# Patient Record
Sex: FEMALE | Race: WHITE | Employment: STUDENT | URBAN - METROPOLITAN AREA
[De-identification: names, ages, dates, MRNs, and addresses within clinical notes are randomized per-mention and may not be internally consistent; named-entity substitution may affect disease eponyms.]

---
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
## 2018-05-31 ENCOUNTER — OFFICE VISIT (OUTPATIENT)
Dept: FAMILY MEDICINE CLINIC | Age: 18
End: 2018-05-31

## 2018-05-31 VITALS
HEART RATE: 97 BPM | WEIGHT: 135 LBS | TEMPERATURE: 99.3 F | OXYGEN SATURATION: 98 % | DIASTOLIC BLOOD PRESSURE: 78 MMHG | BODY MASS INDEX: 23.54 KG/M2 | SYSTOLIC BLOOD PRESSURE: 118 MMHG

## 2018-05-31 DIAGNOSIS — J02.9 ACUTE VIRAL PHARYNGITIS: Primary | ICD-10-CM

## 2018-05-31 DIAGNOSIS — J06.9 UPPER RESPIRATORY TRACT INFECTION, UNSPECIFIED TYPE: ICD-10-CM

## 2018-05-31 LAB — S PYO AG THROAT QL: NORMAL

## 2018-05-31 PROCEDURE — 87880 STREP A ASSAY W/OPTIC: CPT | Performed by: PHYSICIAN ASSISTANT

## 2018-05-31 PROCEDURE — 99213 OFFICE O/P EST LOW 20 MIN: CPT | Performed by: PHYSICIAN ASSISTANT

## 2018-05-31 ASSESSMENT — ENCOUNTER SYMPTOMS
SINUS PRESSURE: 1
NAUSEA: 0
VOMITING: 0
COUGH: 0
EYE PAIN: 0
SHORTNESS OF BREATH: 0
SORE THROAT: 1
RHINORRHEA: 1

## 2018-08-20 ENCOUNTER — TELEPHONE (OUTPATIENT)
Dept: FAMILY MEDICINE CLINIC | Age: 18
End: 2018-08-20

## 2018-08-20 DIAGNOSIS — N94.6 DYSMENORRHEA: Primary | ICD-10-CM

## 2018-08-20 RX ORDER — NORGESTIMATE AND ETHINYL ESTRADIOL 0.25-0.035
1 KIT ORAL DAILY
Qty: 84 TABLET | Refills: 3 | Status: SHIPPED | OUTPATIENT
Start: 2018-08-20 | End: 2020-11-24

## 2018-08-20 NOTE — TELEPHONE ENCOUNTER
norgestimate-ethinyl estradiol (SPRINTEC 28) 0.25-35 MG-MCG per tablet 84 tablet 3 11/21/2017     Sig - Route:  Take 1 tablet by mouth daily - Oral      [facility] [employer] [place] in chart

## 2020-01-06 ENCOUNTER — OFFICE VISIT (OUTPATIENT)
Dept: ORTHOPEDIC SURGERY | Age: 20
End: 2020-01-06
Payer: COMMERCIAL

## 2020-01-06 ENCOUNTER — TELEPHONE (OUTPATIENT)
Dept: ORTHOPEDIC SURGERY | Age: 20
End: 2020-01-06

## 2020-01-06 VITALS
SYSTOLIC BLOOD PRESSURE: 114 MMHG | HEIGHT: 64 IN | BODY MASS INDEX: 23.03 KG/M2 | HEART RATE: 66 BPM | DIASTOLIC BLOOD PRESSURE: 76 MMHG | WEIGHT: 134.92 LBS | RESPIRATION RATE: 17 BRPM

## 2020-01-06 PROCEDURE — 99203 OFFICE O/P NEW LOW 30 MIN: CPT | Performed by: ORTHOPAEDIC SURGERY
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
## 2020-01-23 ENCOUNTER — OFFICE VISIT (OUTPATIENT)
Dept: ORTHOPEDIC SURGERY | Age: 20
End: 2020-01-23
Payer: COMMERCIAL

## 2020-01-23 VITALS
HEIGHT: 63 IN | SYSTOLIC BLOOD PRESSURE: 120 MMHG | BODY MASS INDEX: 24.8 KG/M2 | WEIGHT: 140 LBS | HEART RATE: 80 BPM | DIASTOLIC BLOOD PRESSURE: 74 MMHG

## 2020-01-23 PROCEDURE — 99213 OFFICE O/P EST LOW 20 MIN: CPT | Performed by: ORTHOPAEDIC SURGERY
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
## 2020-01-29 ENCOUNTER — HOSPITAL ENCOUNTER (OUTPATIENT)
Dept: PHYSICAL THERAPY | Age: 20
Setting detail: THERAPIES SERIES
Discharge: HOME | End: 2020-01-29
Payer: COMMERCIAL

## 2020-01-29 PROCEDURE — 97112 NEUROMUSCULAR REEDUCATION: CPT

## 2020-01-29 PROCEDURE — 97161 PT EVAL LOW COMPLEX 20 MIN: CPT

## 2020-01-29 PROCEDURE — 97140 MANUAL THERAPY 1/> REGIONS: CPT

## 2020-01-29 NOTE — FLOWSHEET NOTE
coordination, kinesthetic sense, posture, motor skill, proprioception of core, proximal hip and LE for self care, mobility, lifting, and ambulation/stair navigation      Manual Treatments:  PROM / STM / Oscillations-Mobs:  G-I, II, III, IV (PA's, Inf., Post.)  [] (15141) Provided manual therapy to mobilize LE, proximal hip and/or LS spine soft tissue/joints for the purpose of modulating pain, promoting relaxation,  increasing ROM, reducing/eliminating soft tissue swelling/inflammation/restriction, improving soft tissue extensibility and allowing for proper ROM for normal function with self care, mobility, lifting and ambulation. Modalities:  None.   [] GAME READY (VASO)- for significant edema, swelling, pain control. Charges:  Timed Code Treatment Minutes: 30   Total Treatment Minutes: 50      [x] EVAL (LOW) 54318 (typically 20 minutes face-to-face)  [] EVAL (MOD) 58114 (typically 30 minutes face-to-face)  [] EVAL (HIGH) 67789 (typically 45 minutes face-to-face)  [] RE-EVAL     [] LV(95640) x     [] IONTO (32735)  [x] NMR (90283) x 1    [] VASO (77076)  [x] Manual (65676) x 1    [] Other:  [] TA (56441)x     [] Mech Traction (88818)  [] ES(attended) (72365)     [] ES (un) (11655): If BW Please Indicate Time In/Out  CPT Code Time in Time out                                   GOALS:  Patient stated goal: Be able to stand and walk without pain. [x]? Progressing: []? Met: []? Not Met: []? Adjusted     Therapist goals for Patient:   Short Term Goals: To be achieved in: 2 weeks  1. Independent in HEP and progression per patient tolerance, in order to prevent re-injury. [x]? Progressing: []? Met: []? Not Met: []? Adjusted  2. Patient will have a decrease in pain to facilitate improvement in movement, function, and ADLs as indicated by Functional Deficits. [x]? Progressing: []? Met: []? Not Met: []? Adjusted     Long Term Goals: To be achieved in: 6 weeks  1.  Disability index score of 10% or less for the physician  [] Other    Prognosis for POC: [x] Good [] Fair  [] Poor    Patient requires continued skilled intervention: [x] Yes  [] No        PLAN: Begin PT addressing L knee ROM and joint mobility and proximal hip strength/stability as ton. [] Continue per plan of care [] Alter current plan (see comments)  [x] Plan of care initiated [] Hold pending MD visit [] Discharge    Electronically signed by: Alber Nguyen PT    Note: If patient does not return for scheduled/recommended follow up visits, this note will serve as a discharge from care along with the most recent update on progress.

## 2020-01-31 ENCOUNTER — HOSPITAL ENCOUNTER (OUTPATIENT)
Dept: PHYSICAL THERAPY | Age: 20
Setting detail: THERAPIES SERIES
Discharge: HOME | End: 2020-01-31
Payer: COMMERCIAL

## 2020-01-31 PROCEDURE — 97110 THERAPEUTIC EXERCISES: CPT

## 2020-01-31 PROCEDURE — 97112 NEUROMUSCULAR REEDUCATION: CPT

## 2020-01-31 PROCEDURE — 97140 MANUAL THERAPY 1/> REGIONS: CPT

## 2020-01-31 NOTE — FLOWSHEET NOTE
[facility] [address] [address]  Phone: [phone] Fax: [phone]    Physical Therapy Treatment Note/ Progress Report:     Date:  2020    Patient Name:  Db Hayes    :  2000  MRN: [id number]  Restrictions/Precautions:    Medical/Treatment Diagnosis Information:  · Diagnosis: M25.562 L knee pain  · Treatment Diagnosis: M25.562 L knee pain  Insurance/Certification information:  PT Insurance Information: [place] - 20 visits  Physician Information:  Referring Practitioner: Kay Red  Plan of care signed (Y/N):     Date of Patient follow up with Physician:      Progress Report: [x]  Yes  []  No     Date Range for reporting period:  Beginnin20  Ending:  NA    Progress report due (10 Rx/or 30 days whichever is less): 54     Recertification due (POC duration/ or 90 days whichever is less): 3/10/20     Visit # Insurance Allowable Auth Needed   2 [place] - 20 visits []Yes   [x]No     Latex Allergy:  [x]NO      []YES  Preferred Language for Healthcare:   [x]English       []other:  Functional Scale: 28% disability - LEFS (58/80)  Date assessed:  20    Pain level:  2-3/10 today     SUBJECTIVE:  Pt states that her pain is the worst at the side of the knee near the back. This pain worsens when standing for long periods. The knee feels the best in the morning when keeping it moving. OBJECTIVE: Cues for quad control at end-range extension in gait.  Observation:    Test measurements:      RESTRICTIONS/PRECAUTIONS: R labral debridement . Abdominal hernia repair . History of up to 5 concussions [id number]. Exercises/Interventions:     Therapeutic Ex (23350)   Min: 18 Sets/sec Reps Notes/CUES   Lateral TB walks - TB around ankles 2 20 Bacon at ankles   Mod.  BOSU lunge w/isometric hold - TB pulling medially  5sec 10    Squat taps to chair - TB around knees np     Bridges with band 10s 10 orange   JUAN ANTONIO aragon with quad control focus 3sec 15                Manual Intervention (29646)  Min:  8      L proximal fibula mobs - anterior glide  3 min Grade 2-3   L tibiofemoral glide - anterior glide  3 min Grade 2-3   L ER tibial mob - end range extension  4 min Grade 2-3                     NMR Re-education (19394)  Min: 15   CUES NEEDED   Prone froggers - glut activation variations - DL, knees off mat, SL for individual act/coordination 5s 10 each variation Cues to decrease hamstring and lumbar activation   Clamshells sidelying  maroon 20    [place] - glut activation focus np     Lateral stepdowns on 4\" - TB pulling medially np                                         Therapeutic Activity (06433)  Min:  0      Ladders      [place]      Dynamic Balance                            Therapeutic Exercise and NMR EXR  [x] (97897) Provided verbal/tactile cueing for activities related to strengthening, flexibility, endurance, ROM for improvements in LE, proximal hip, and core control with self care, mobility, lifting, ambulation. [x] (49655) Provided verbal/tactile cueing for activities related to improving balance, coordination, kinesthetic sense, posture, motor skill, proprioception  to assist with LE, proximal hip, and core control in self care, mobility, lifting, ambulation and eccentric single leg control.      NMR and Therapeutic Activities:    [x] (27635 or 48316) Provided verbal/tactile cueing for activities related to improving balance, coordination, kinesthetic sense, posture, motor skill, proprioception and motor activation to allow for proper function of core, proximal hip and LE with self care and ADLs and functional mobility.   [] (55253) Gait Re-education- Provided training and instruction to the patient for proper LE, core and proximal hip recruitment and positioning and eccentric body weight control with ambulation re-education including up and down stairs     Home Exercise Program:    [x] (02171) Reviewed/Progressed HEP activities related to strengthening, flexibility, endurance, ROM of core, proximal hip and LE for functional self-care, mobility, lifting and ambulation/stair navigation   [] (57882)Reviewed/Progressed HEP activities related to improving balance, coordination, kinesthetic sense, posture, motor skill, proprioception of core, proximal hip and LE for self care, mobility, lifting, and ambulation/stair navigation      Manual Treatments:  PROM / STM / Oscillations-Mobs:  G-I, II, III, IV (PA's, Inf., Post.)  [] (88995) Provided manual therapy to mobilize LE, proximal hip and/or LS spine soft tissue/joints for the purpose of modulating pain, promoting relaxation,  increasing ROM, reducing/eliminating soft tissue swelling/inflammation/restriction, improving soft tissue extensibility and allowing for proper ROM for normal function with self care, mobility, lifting and ambulation. Modalities:  None.   [] GAME READY (VASO)- for significant edema, swelling, pain control. Charges:  Timed Code Treatment Minutes: 41   Total Treatment Minutes: 45      [] EVAL (LOW) 80409 (typically 20 minutes face-to-face)  [] EVAL (MOD) 20902 (typically 30 minutes face-to-face)  [] EVAL (HIGH) 14040 (typically 45 minutes face-to-face)  [] RE-EVAL     [x] IM(44264) x 1     [] IONTO (19632)  [x] NMR (01950) x 1    [] VASO (80272)  [x] Manual (26651) x 1    [] Other:  [] TA (91049)x     [] Mech Traction (08307)  [] ES(attended) (52790)     [] ES (un) (90228): If BW Please Indicate Time In/Out  CPT Code Time in Time out                                   GOALS:  Patient stated goal: Be able to stand and walk without pain. [x]? Progressing: []? Met: []? Not Met: []? Adjusted     Therapist goals for Patient:   Short Term Goals: To be achieved in: 2 weeks  1. Independent in HEP and progression per patient tolerance, in order to prevent re-injury. [x]? Progressing: []? Met: []? Not Met: []? Adjusted  2.  Patient will have a decrease in pain to facilitate improvement in movement, function, and ADLs as indicated by Functional Deficits. [x]? Progressing: []? Met: []? Not Met: []? Adjusted     Long Term Goals: To be achieved in: 6 weeks  1. Disability index score of 10% or less for the LEFS to assist with reaching prior level of function. [x]? Progressing: [x]? Met: []? Not Met: []? Adjusted  2. Patient will demonstrate increased L knee AROM to allow for proper joint functioning as indicated by patients Functional Deficits. [x]? Progressing: []? Met: []? Not Met: []? Adjusted   3. Patient will demonstrate an increase in strength to good proximal hip strength and control, within 5lb HHD in LE to allow for proper functional mobility as indicated by patients Functional Deficits. [x]? Progressing: []? Met: []? Not Met: []? Adjusted  4. Patient will be able to tolerate prolonged standing >30 minutes and walking >2 miles without increased symptoms or restriction. [x]? Progressing: []? Met: []? Not Met: []? Adjusted   5. Patient will be able to perform a full depth squat without increased symptoms or restriction. [x]? Progressing: []? Met: []? Not Met: []? Adjusted         ASSESSMENT:  Decreased discomfort with exercise and movement until focused on quad control in SLS with marches, then felt a little more sore after that. Pt to benefit from proximal hip strengthening and quad activation in closed chain positions per tolerance. If closed chain exercise provokes more pain, Pt may benefit from BFR for the strength benefits while limiting mechanical loads on the knee.       Return to Play: (if applicable)   []  Stage 1: Intro to Strength   []  Stage 2: Return to Run and Strength   []  Stage 3: Return to Jump and Strength   []  Stage 4: Dynamic Strength and Agility   []  Stage 5: Sport Specific Training     []  Ready to Return to Play, Meets All Above Stages   []  Not Ready for Return to Sports   Comments:

## 2020-02-05 ENCOUNTER — HOSPITAL ENCOUNTER (OUTPATIENT)
Dept: PHYSICAL THERAPY | Age: 20
Setting detail: THERAPIES SERIES
Discharge: HOME | End: 2020-02-05
Payer: COMMERCIAL

## 2020-02-05 PROCEDURE — 97140 MANUAL THERAPY 1/> REGIONS: CPT

## 2020-02-05 PROCEDURE — 97110 THERAPEUTIC EXERCISES: CPT

## 2020-02-05 NOTE — FLOWSHEET NOTE
marches with quad control focus - fwd/bwd 5s 10    Leg press - DL - TB around knees (maroon) - 85#  20 Eccentric emphasis         Manual Intervention ([phone])  Min:  10      L proximal fibula mobs - anterior glide  3 min Grade 2-3   L tibiofemoral glide - anterior glide  3 min Grade 2-3   L ER tibial mob - end range extension  4 min Grade 2-3                     NMR Re-education (17253)  Min: 0   CUES NEEDED   Prone froggers - glut activation variations - DL, knees off mat, SL for individual act/coordination 5s 10 each variation Cues to decrease hamstring and lumbar activation   Clamshells sidelying  maroon 20    Surveyor - glut activation focus np     Lateral stepdowns on 4\" - TB pulling medially np                                         Therapeutic Activity (47730)  Min:  0      Ladders      Plyos      Dynamic Balance                            Therapeutic Exercise and NMR EXR  [x] (36565) Provided verbal/tactile cueing for activities related to strengthening, flexibility, endurance, ROM for improvements in LE, proximal hip, and core control with self care, mobility, lifting, ambulation. [x] (28793) Provided verbal/tactile cueing for activities related to improving balance, coordination, kinesthetic sense, posture, motor skill, proprioception  to assist with LE, proximal hip, and core control in self care, mobility, lifting, ambulation and eccentric single leg control.      NMR and Therapeutic Activities:    [x] (96774 or 85617) Provided verbal/tactile cueing for activities related to improving balance, coordination, kinesthetic sense, posture, motor skill, proprioception and motor activation to allow for proper function of core, proximal hip and LE with self care and ADLs and functional mobility.   [] (27811) Gait Re-education- Provided training and instruction to the patient for proper LE, core and proximal hip recruitment and positioning and eccentric body weight control with ambulation re-education including up Progressing: []? Met: []? Not Met: []? Adjusted  2. Patient will have a decrease in pain to facilitate improvement in movement, function, and ADLs as indicated by Functional Deficits. [x]? Progressing: []? Met: []? Not Met: []? Adjusted     Long Term Goals: To be achieved in: 6 weeks  1. Disability index score of 10% or less for the LEFS to assist with reaching prior level of function. [x]? Progressing: [x]? Met: []? Not Met: []? Adjusted  2. Patient will demonstrate increased L knee AROM to allow for proper joint functioning as indicated by patients Functional Deficits. [x]? Progressing: []? Met: []? Not Met: []? Adjusted   3. Patient will demonstrate an increase in strength to good proximal hip strength and control, within 5lb HHD in LE to allow for proper functional mobility as indicated by patients Functional Deficits. [x]? Progressing: []? Met: []? Not Met: []? Adjusted  4. Patient will be able to tolerate prolonged standing >30 minutes and walking >2 miles without increased symptoms or restriction. [x]? Progressing: []? Met: []? Not Met: []? Adjusted   5. Patient will be able to perform a full depth squat without increased symptoms or restriction. [x]? Progressing: []? Met: []? Not Met: []? Adjusted         ASSESSMENT:  Initiated BFR for strength benefits while limiting mechanical loads to the L knee secondary to increased pain sxs with CKC exercise. Patient appropriately fatigued following.      Return to Play: (if applicable)   []  Stage 1: Intro to Strength   []  Stage 2: Return to Run and Strength   []  Stage 3: Return to Jump and Strength   []  Stage 4: Dynamic Strength and Agility   []  Stage 5: Sport Specific Training     []  Ready to Return to Play, Meets All Above Stages   []  Not Ready for Return to Sports   Comments:            Treatment/Activity Tolerance:  [x] Patient tolerated treatment well [] Patient limited by fatique  [] Patient limited by pain  [] Patient limited by other medical complications  [] Other:     Overall Progression Towards Functional goals/ Treatment Progress Update:  [x] Patient is progressing as expected towards functional goals listed. [] Progression is slowed due to complexities/Impairments listed. [] Progression has been slowed due to co-morbidities. [] Plan just implemented, too soon to assess goals progression <30days   [] Goals require adjustment due to lack of progress  [] Patient is not progressing as expected and requires additional follow up with physician  [] Other    Prognosis for POC: [x] Good [] Fair  [] Poor    Patient requires continued skilled intervention: [x] Yes  [] No        PLAN: Begin PT addressing L knee ROM and joint mobility and proximal hip strength/stability as ton. [x] Continue per plan of care [] Alter current plan (see comments)  [] Plan of care initiated [] Hold pending MD visit [] Discharge    Electronically signed by: Reynaldo Almaguer PT    Note: If patient does not return for scheduled/recommended follow up visits, this note will serve as a discharge from care along with the most recent update on progress.

## 2020-02-07 ENCOUNTER — HOSPITAL ENCOUNTER (OUTPATIENT)
Dept: PHYSICAL THERAPY | Age: 20
Setting detail: THERAPIES SERIES
Discharge: HOME | End: 2020-02-07
Payer: COMMERCIAL

## 2020-02-07 PROCEDURE — 97110 THERAPEUTIC EXERCISES: CPT

## 2020-02-07 PROCEDURE — 97140 MANUAL THERAPY 1/> REGIONS: CPT

## 2020-02-07 NOTE — FLOWSHEET NOTE
ADLs and functional mobility.   [] (37117) Gait Re-education- Provided training and instruction to the patient for proper LE, core and proximal hip recruitment and positioning and eccentric body weight control with ambulation re-education including up and down stairs     Home Exercise Program:    [] (10129) Reviewed/Progressed HEP activities related to strengthening, flexibility, endurance, ROM of core, proximal hip and LE for functional self-care, mobility, lifting and ambulation/stair navigation   [] (39750)Reviewed/Progressed HEP activities related to improving balance, coordination, kinesthetic sense, posture, motor skill, proprioception of core, proximal hip and LE for self care, mobility, lifting, and ambulation/stair navigation      Manual Treatments:  PROM / STM / Oscillations-Mobs:  G-I, II, III, IV (PA's, Inf., Post.)  [x] (84785) Provided manual therapy to mobilize LE, proximal hip and/or LS spine soft tissue/joints for the purpose of modulating pain, promoting relaxation,  increasing ROM, reducing/eliminating soft tissue swelling/inflammation/restriction, improving soft tissue extensibility and allowing for proper ROM for normal function with self care, mobility, lifting and ambulation. Modalities:  None.   [] GAME READY (VASO)- for significant edema, swelling, pain control. Charges:  Timed Code Treatment Minutes: 45   Total Treatment Minutes: 45      [] EVAL (LOW) 76183 (typically 20 minutes face-to-face)  [] EVAL (MOD) 89453 (typically 30 minutes face-to-face)  [] EVAL (HIGH) 08967 (typically 45 minutes face-to-face)  [] RE-EVAL     [x] FS(04523) x 2     [] IONTO (78767)  [] NMR (93300) x     [] VASO (59186)  [x] Manual (02144) x 1    [] Other:  [] TA (74788)x     [] Mech Traction (82878)  [] ES(attended) (90237)     [] ES (un) (12769):      If BWC Please Indicate Time In/Out  CPT Code Time in Time out                                   GOALS:  Patient stated goal: Be able to stand and walk without pain. [x]? Progressing: []? Met: []? Not Met: []? Adjusted     Therapist goals for Patient:   Short Term Goals: To be achieved in: 2 weeks  1. Independent in HEP and progression per patient tolerance, in order to prevent re-injury. [x]? Progressing: []? Met: []? Not Met: []? Adjusted  2. Patient will have a decrease in pain to facilitate improvement in movement, function, and ADLs as indicated by Functional Deficits. [x]? Progressing: []? Met: []? Not Met: []? Adjusted     Long Term Goals: To be achieved in: 6 weeks  1. Disability index score of 10% or less for the LEFS to assist with reaching prior level of function. [x]? Progressing: [x]? Met: []? Not Met: []? Adjusted  2. Patient will demonstrate increased L knee AROM to allow for proper joint functioning as indicated by patients Functional Deficits. [x]? Progressing: []? Met: []? Not Met: []? Adjusted   3. Patient will demonstrate an increase in strength to good proximal hip strength and control, within 5lb HHD in LE to allow for proper functional mobility as indicated by patients Functional Deficits. [x]? Progressing: []? Met: []? Not Met: []? Adjusted  4. Patient will be able to tolerate prolonged standing >30 minutes and walking >2 miles without increased symptoms or restriction. [x]? Progressing: []? Met: []? Not Met: []? Adjusted   5. Patient will be able to perform a full depth squat without increased symptoms or restriction. [x]? Progressing: []? Met: []? Not Met: []? Adjusted         ASSESSMENT:  Continued BFR for strength benefits while limiting mechanical loads to the L knee due to increased complaints of pain with CKC exercise. Able to initiate TRX squat isometrics to pain free depth with focus on squat mechanics, particularly bilateral knee position (motor control) throughout limited depth. Patient appropriately fatigued following.      Return to Play: (if applicable)   []  Stage 1: Intro to Strength   []  Stage 2: Return to Run

## 2020-02-10 ENCOUNTER — HOSPITAL ENCOUNTER (OUTPATIENT)
Dept: PHYSICAL THERAPY | Age: 20
Setting detail: THERAPIES SERIES
Discharge: HOME | End: 2020-02-10
Payer: COMMERCIAL

## 2020-02-10 PROCEDURE — 97140 MANUAL THERAPY 1/> REGIONS: CPT

## 2020-02-10 PROCEDURE — 97110 THERAPEUTIC EXERCISES: CPT

## 2020-02-10 NOTE — FLOWSHEET NOTE
Sport cord marches with quad control focus - fwd/bwd 5s 10    Leg press - DL - TB around knees (maroon) - 85#  20 Eccentric emphasis   TRX squat isos - to pain free depth - maroon loop around knees 5s 10    Leg press quad isometrics - varying angles (2) 10s 10 each position    Cybex quad isos - varying angles (2) 10s 10 each position                Manual Intervention (12049)  Min:  10      L proximal fibula mobs - anterior glide  3 min Grade 2-3   L tibiofemoral glide - anterior glide  3 min Grade 2-3   L ER tibial mob - end range extension  4 min Grade 2-3                     NMR Re-education (81144)  Min: 0   CUES NEEDED   Prone froggers - glut activation variations - DL, knees off mat, SL for individual act/coordination 5s 10 each variation Cues to decrease hamstring and lumbar activation   Clamshells sidelying  maroon 20    Gerry - glut activation focus np     Lateral stepdowns on 4\" - TB pulling medially np                                         Therapeutic Activity (57102)  Min:  0      Ladders      Plyos      Dynamic Balance                            Therapeutic Exercise and NMR EXR  [x] (17172) Provided verbal/tactile cueing for activities related to strengthening, flexibility, endurance, ROM for improvements in LE, proximal hip, and core control with self care, mobility, lifting, ambulation. [x] (86783) Provided verbal/tactile cueing for activities related to improving balance, coordination, kinesthetic sense, posture, motor skill, proprioception  to assist with LE, proximal hip, and core control in self care, mobility, lifting, ambulation and eccentric single leg control. NMR and Therapeutic Activities:    [x] (95578 or 80250) Provided verbal/tactile cueing for activities related to improving balance, coordination, kinesthetic sense, posture, motor skill, proprioception and motor activation to allow for proper function of core, proximal hip and LE with self care and ADLs and functional mobility. [] (77100) Gait Re-education- Provided training and instruction to the patient for proper LE, core and proximal hip recruitment and positioning and eccentric body weight control with ambulation re-education including up and down stairs     Home Exercise Program:    [] (80370) Reviewed/Progressed HEP activities related to strengthening, flexibility, endurance, ROM of core, proximal hip and LE for functional self-care, mobility, lifting and ambulation/stair navigation   [] (14512)Reviewed/Progressed HEP activities related to improving balance, coordination, kinesthetic sense, posture, motor skill, proprioception of core, proximal hip and LE for self care, mobility, lifting, and ambulation/stair navigation      Manual Treatments:  PROM / STM / Oscillations-Mobs:  G-I, II, III, IV (PA's, Inf., Post.)  [x] (26000) Provided manual therapy to mobilize LE, proximal hip and/or LS spine soft tissue/joints for the purpose of modulating pain, promoting relaxation,  increasing ROM, reducing/eliminating soft tissue swelling/inflammation/restriction, improving soft tissue extensibility and allowing for proper ROM for normal function with self care, mobility, lifting and ambulation. Modalities:  None.   [] GAME READY (VASO)- for significant edema, swelling, pain control. Charges:  Timed Code Treatment Minutes: 35   Total Treatment Minutes: 35      [] EVAL (LOW) 21146 (typically 20 minutes face-to-face)  [] EVAL (MOD) 39985 (typically 30 minutes face-to-face)  [] EVAL (HIGH) 50250 (typically 45 minutes face-to-face)  [] RE-EVAL     [x] WG(70112) x 2     [] IONTO (79429)  [] NMR (81663) x     [] VASO (07863)  [x] Manual (15449) x 1    [] Other:  [] TA (33197)x     [] Mech Traction (69274)  [] ES(attended) (63043)     [] ES (un) (37070): If BWC Please Indicate Time In/Out  CPT Code Time in Time out                                   GOALS:  Patient stated goal: Be able to stand and walk without pain. [x]?  Progressing: []? Met: []? Not Met: []? Adjusted     Therapist goals for Patient:   Short Term Goals: To be achieved in: 2 weeks  1. Independent in HEP and progression per patient tolerance, in order to prevent re-injury. [x]? Progressing: []? Met: []? Not Met: []? Adjusted  2. Patient will have a decrease in pain to facilitate improvement in movement, function, and ADLs as indicated by Functional Deficits. [x]? Progressing: []? Met: []? Not Met: []? Adjusted     Long Term Goals: To be achieved in: 6 weeks  1. Disability index score of 10% or less for the LEFS to assist with reaching prior level of function. [x]? Progressing: [x]? Met: []? Not Met: []? Adjusted  2. Patient will demonstrate increased L knee AROM to allow for proper joint functioning as indicated by patients Functional Deficits. [x]? Progressing: []? Met: []? Not Met: []? Adjusted   3. Patient will demonstrate an increase in strength to good proximal hip strength and control, within 5lb HHD in LE to allow for proper functional mobility as indicated by patients Functional Deficits. [x]? Progressing: []? Met: []? Not Met: []? Adjusted  4. Patient will be able to tolerate prolonged standing >30 minutes and walking >2 miles without increased symptoms or restriction. [x]? Progressing: []? Met: []? Not Met: []? Adjusted   5. Patient will be able to perform a full depth squat without increased symptoms or restriction. [x]? Progressing: []? Met: []? Not Met: []? Adjusted         ASSESSMENT:  Continued BFR for strength benefits while limiting mechanical loads to the L knee due to increased complaints of pain with CKC exercise. Focused on L quad isometrics on leg press and cybex quad extension machine at varying angles of L knee extension during today's session with longer hold times to address primary complaints of fatigue and increased pain with prolonged weight bearing activities. Patient appropriately fatigued following session.      Return to Play: (if applicable)   []  Stage 1: Intro to Strength   []  Stage 2: Return to Run and Strength   []  Stage 3: Return to Jump and Strength   []  Stage 4: Dynamic Strength and Agility   []  Stage 5: Sport Specific Training     []  Ready to Return to Play, Meets All Above Stages   []  Not Ready for Return to Sports   Comments:            Treatment/Activity Tolerance:  [x] Patient tolerated treatment well [] Patient limited by fatique  [] Patient limited by pain  [] Patient limited by other medical complications  [] Other:     Overall Progression Towards Functional goals/ Treatment Progress Update:  [x] Patient is progressing as expected towards functional goals listed. [] Progression is slowed due to complexities/Impairments listed. [] Progression has been slowed due to co-morbidities. [] Plan just implemented, too soon to assess goals progression <30days   [] Goals require adjustment due to lack of progress  [] Patient is not progressing as expected and requires additional follow up with physician  [] Other    Prognosis for POC: [x] Good [] Fair  [] Poor    Patient requires continued skilled intervention: [x] Yes  [] No        PLAN: Begin PT addressing L knee ROM and joint mobility and proximal hip strength/stability as ton. [x] Continue per plan of care [] Alter current plan (see comments)  [] Plan of care initiated [] Hold pending MD visit [] Discharge    Electronically signed by: Vikki Fletcher PT    Note: If patient does not return for scheduled/recommended follow up visits, this note will serve as a discharge from care along with the most recent update on progress.

## 2020-02-12 ENCOUNTER — HOSPITAL ENCOUNTER (OUTPATIENT)
Dept: PHYSICAL THERAPY | Age: 20
Setting detail: THERAPIES SERIES
Discharge: HOME | End: 2020-02-12
Payer: COMMERCIAL

## 2020-02-12 PROCEDURE — 97110 THERAPEUTIC EXERCISES: CPT

## 2020-02-12 PROCEDURE — 97140 MANUAL THERAPY 1/> REGIONS: CPT

## 2020-02-12 NOTE — FLOWSHEET NOTE
control focus - fwd/bwd 5s 10    Leg press - DL - TB around knees (maroon) - 85#  20 Eccentric emphasis   TRX squat isos - to pain free depth - maroon loop around knees 5s 10    Leg press quad isometrics np     Cybex quad isos np     Sidelying hip abduction/clam  2 10    Glute med iso's 2 10                            Manual Intervention (64379)  Min:  10      L proximal fibula mobs - anterior glide   min Grade 2-3   L tibiofemoral glide - anterior glide   min Grade 2-3   L ER tibial mob - end range extension   min Grade 2-3   STM lateral knee/ quad 10min                 NMR Re-education (26969)  Min: 0   CUES NEEDED   Prone froggers - glut activation variations - DL, knees off mat, SL for individual act/coordination 5s 10 each variation Cues to decrease hamstring and lumbar activation   Clamshells sidelying  maroon 20    [place] - glut activation focus np     Lateral stepdowns on 4\" - TB pulling medially np     SLS tramp toss  2 10                                  Therapeutic Activity (01900)  Min:  0      Ladders      Plyos      Dynamic Balance                            Therapeutic Exercise and NMR EXR  [x] (87687) Provided verbal/tactile cueing for activities related to strengthening, flexibility, endurance, ROM for improvements in LE, proximal hip, and core control with self care, mobility, lifting, ambulation. [x] (07916) Provided verbal/tactile cueing for activities related to improving balance, coordination, kinesthetic sense, posture, motor skill, proprioception  to assist with LE, proximal hip, and core control in self care, mobility, lifting, ambulation and eccentric single leg control.      NMR and Therapeutic Activities:    [x] (04853 or 93153) Provided verbal/tactile cueing for activities related to improving balance, coordination, kinesthetic sense, posture, motor skill, proprioception and motor activation to allow for proper function of core, proximal hip and LE with self care and ADLs and functional Agility   []  Stage 5: Sport Specific Training     []  Ready to Return to Play, [employer]   []  Not Ready for Return to Sports   Comments:            Treatment/Activity Tolerance:  [x] Patient tolerated treatment well [] Patient limited by fatique  [] Patient limited by pain  [] Patient limited by other medical complications  [] Other:     Overall Progression Towards Functional goals/ Treatment Progress Update:  [x] Patient is progressing as expected towards functional goals listed. [] Progression is slowed due to complexities/Impairments listed. [] Progression has been slowed due to co-morbidities. [] Plan just implemented, too soon to assess goals progression <30days   [] Goals require adjustment due to lack of progress  [] Patient is not progressing as expected and requires additional follow up with physician  [] Other    Prognosis for POC: [x] Good [] Fair  [] Poor    Patient requires continued skilled intervention: [x] Yes  [] No        PLAN: Begin PT addressing L knee ROM and joint mobility and proximal hip strength/stability as ton. [x] Continue per plan of care [] Alter current plan (see comments)  [] Plan of care initiated [] Hold pending MD visit [] Discharge    Electronically signed by: Tatyana Elizabeth PTA    Note: If patient does not return for scheduled/recommended follow up visits, this note will serve as a discharge from care along with the most recent update on progress.

## 2020-02-17 ENCOUNTER — HOSPITAL ENCOUNTER (OUTPATIENT)
Dept: PHYSICAL THERAPY | Age: 20
Setting detail: THERAPIES SERIES
Discharge: HOME | End: 2020-02-17
Payer: COMMERCIAL

## 2020-02-17 PROCEDURE — 97140 MANUAL THERAPY 1/> REGIONS: CPT

## 2020-02-17 PROCEDURE — 97110 THERAPEUTIC EXERCISES: CPT

## 2020-02-17 NOTE — FLOWSHEET NOTE
[facility] [address] [facility] [address]  Phone: [phone] Fax: [phone]    Physical Therapy Treatment Note/ Progress Report:     Date:  2020    Patient Name:  Grant Mercer    :  2000  MRN: [id number]  Restrictions/Precautions:    Medical/Treatment Diagnosis Information:  · Diagnosis: M25.562 L knee pain  · Treatment Diagnosis: M25.562 L knee pain  Insurance/Certification information:  PT Insurance Information: [place] - 20 visits  Physician Information:  Referring Practitioner: Adrian Chavez  Plan of care signed (Y/N):     Date of Patient follow up with Physician:      Progress Report: []  Yes  [x]  No     Date Range for reporting period:  Beginnin20  Ending:  NA    Progress report due (10 Rx/or 30 days whichever is less):      Recertification due (POC duration/ or 90 days whichever is less): 3/10/20     Visit # Insurance Allowable Auth Needed   6 [place] - 20 visits []Yes   [x]No     Latex Allergy:  [x]NO      []YES  Preferred Language for Healthcare:   [x]English       []other:  Functional Scale: 28% disability - LEFS ()  Date assessed:  20    Pain level:  0/10     SUBJECTIVE:  Patient reports that she has still been painful at lateral knee and down into shin. OBJECTIVE: trigger points noted in ant tib, lateral hamstring and ITBand. Addressed with STM.  Observation:    Test measurements:      RESTRICTIONS/PRECAUTIONS: R labral debridement . Abdominal hernia repair . History of up to 5 concussions 4136-6396. Exercises/Interventions:     Therapeutic Ex (76847)   Min: 35 Sets/sec Reps Notes/CUES   BFR - strength protocol - 70% LOP  8 min 123mmHg - 30 SLR/15 sidelying hip abd/15 LAQ   Lateral TB walks - TB around ankles 2 30 [place] at ankles   BOSU lunge w/isometric hold - TB pulling medially Mod.   5sec 10    Squat taps to chair - TB around knees np     Bridges with TB  10s 15 orange Sport cord marches with quad control focus - fwd/bwd 5s 10    Leg press - DL - Ball squeeze for VMO- 85#  20 Eccentric emphasis   TRX squat isos - to pain free depth - maroon loop around knees 5s 10    Leg press quad isometrics np     Cybex quad isos np     Sidelying hip abduction/clam  2 10    Glute med iso's 2 10    Gastoc/Soleus/HS stretch 30 3                      Manual Intervention (88491)  Min:  20      L proximal fibula mobs - anterior glide   min Grade 2-3   L tibiofemoral glide - anterior glide   min Grade 2-3   L ER tibial mob - end range extension   min Grade 2-3   STM lateral knee/ HS/ ant tib 10min     Fib head mobs 5min     Patella taping for tracking 5min     NMR Re-education (46612)  Min: 0   CUES NEEDED   Prone froggers - glut activation variations - DL, knees off mat, SL for individual act/coordination 5s 10 each variation Cues to decrease hamstring and lumbar activation   Clamshells sidelying  maroon 20    [place] - glut activation focus np     Lateral stepdowns on 4\" - TB pulling medially np     SLS tramp toss  2 10                                  Therapeutic Activity (96806)  Min:  0      Ladders      [place]      Dynamic Balance                            Therapeutic Exercise and NMR EXR  [x] (49860) Provided verbal/tactile cueing for activities related to strengthening, flexibility, endurance, ROM for improvements in LE, proximal hip, and core control with self care, mobility, lifting, ambulation. [x] (97592) Provided verbal/tactile cueing for activities related to improving balance, coordination, kinesthetic sense, posture, motor skill, proprioception  to assist with LE, proximal hip, and core control in self care, mobility, lifting, ambulation and eccentric single leg control.      NMR and Therapeutic Activities:    [x] (17644 or 46921) Provided verbal/tactile cueing for activities related to improving balance, coordination, kinesthetic sense, posture, motor skill, proprioception and motor Play, Meets All Above Stages   []  Not Ready for Return to Sports   Comments:            Treatment/Activity Tolerance:  [x] Patient tolerated treatment well [] Patient limited by fatique  [] Patient limited by pain  [] Patient limited by other medical complications  [] Other:     Overall Progression Towards Functional goals/ Treatment Progress Update:  [x] Patient is progressing as expected towards functional goals listed. [] Progression is slowed due to complexities/Impairments listed. [] Progression has been slowed due to co-morbidities. [] Plan just implemented, too soon to assess goals progression <30days   [] Goals require adjustment due to lack of progress  [] Patient is not progressing as expected and requires additional follow up with physician  [] Other    Prognosis for POC: [x] Good [] Fair  [] Poor    Patient requires continued skilled intervention: [x] Yes  [] No        PLAN: Begin PT addressing L knee ROM and joint mobility and proximal hip strength/stability as ton. [x] Continue per plan of care [] Alter current plan (see comments)  [] Plan of care initiated [] Hold pending MD visit [] Discharge    Electronically signed by: Chrissy Leavitt PTA    Note: If patient does not return for scheduled/recommended follow up visits, this note will serve as a discharge from care along with the most recent update on progress.

## 2020-02-19 ENCOUNTER — HOSPITAL ENCOUNTER (OUTPATIENT)
Dept: PHYSICAL THERAPY | Age: 20
Setting detail: THERAPIES SERIES
Discharge: HOME | End: 2020-02-19
Payer: COMMERCIAL

## 2020-02-19 PROCEDURE — 97140 MANUAL THERAPY 1/> REGIONS: CPT

## 2020-02-19 PROCEDURE — 97110 THERAPEUTIC EXERCISES: CPT

## 2020-02-19 NOTE — FLOWSHEET NOTE
[place] [address] [place] [address]  Phone: [phone] Fax: [phone]    Physical Therapy Treatment Note/ Progress Report:     Date:  2020    Patient Name:  Jada Garcia    :  2000  MRN: [id number]  Restrictions/Precautions:    Medical/Treatment Diagnosis Information:  · Diagnosis: M25.562 L knee pain  · Treatment Diagnosis: M25.562 L knee pain  Insurance/Certification information:  PT Insurance Information: [place] - 20 visits  Physician Information:  Referring Practitioner: Riley Suh  Plan of care signed (Y/N):     Date of Patient follow up with Physician:      Progress Report: []  Yes  [x]  No     Date Range for reporting period:  Beginnin20  Ending:  NA    Progress report due (10 Rx/or 30 days whichever is less): 39     Recertification due (POC duration/ or 90 days whichever is less): 3/10/20     Visit # Insurance Allowable Auth Needed   7 [place] - 20 visits []Yes   [x]No     Latex Allergy:  [x]NO      []YES  Preferred Language for Healthcare:   [x]English       []other:  Functional Scale: 28% disability - LEFS (58/80)  Date assessed:  20    Pain level:  2/10     SUBJECTIVE:  Patient reports decreased pain in her L lateral knee and lateral shin since previous session, but some pain still present especially after prolonged standing or walking. Patient feels like it may have been the quad isos performed last week that increased her pain sxs because her sxs increased 1-2 hours following. OBJECTIVE: Trigger points noted in ant tib, lateral hamstring, and ITB. Addressed with STM.  Observation:    Test measurements:      RESTRICTIONS/PRECAUTIONS: R labral debridement . Abdominal hernia repair . History of up to 5 concussions 5155-7880.      Exercises/Interventions:     Therapeutic Ex (82991)   Min: 30 Sets/sec Reps Notes/CUES   BFR - strength protocol - 70% LOP  8 min 123mmHg - 30 SLR/15 sidelying hip abd/15 LAQ   Lateral TB walks - TB around ankles 2 30 [place] at ankles   BOSU lunge w/isometric hold - TB pulling medially Mod. 5sec 10    Squat taps to chair - TB around knees np     Bridges with TB  10s 15 orange   Sport cord marches with quad control focus - fwd/bwd 5s 10    Leg press - DL - Ball squeeze for VMO- 85#  20 Eccentric emphasis   TRX squat isos - to pain free depth - maroon loop around knees 5s 10    Leg press quad isometrics np     Cybex quad isos np     SLR flexion  20    Sidelying hip abduction/clamshell 2 10    Glute med iso w/SWB against wall 2 10    Gastoc/Soleus/HS stretch 30 3    Prone hip extension - flexed at EOB 5s 10                Manual Intervention (84220)  Min:  20      L proximal fibula mobs - anterior glide  5 min Grade 2-3   L tibiofemoral glide - anterior glide  5 min Grade 2-3   L ER tibial mob - end range extension  5 min Grade 2-3   STM lateral knee/ HS/ ant tib  5 min          Patella taping for tracking  np                NMR Re-education (31960)  Min: 5   CUES NEEDED   Prone froggers - glut activation variations - DL, knees off mat, SL for individual act/coordination 5s 10 each variation Cues to decrease hamstring and lumbar activation   Clamshells sidelying  maroon 20    [place] - glut activation focus np     Lateral stepdowns on 4\" - TB pulling medially np     SLS tramp toss - 3-way 2 10                                  Therapeutic Activity (36248)  Min:  0      Ladders      [place]ly[place]      Dynamic Balance                            Therapeutic Exercise and NMR EXR  [x] (53717) Provided verbal/tactile cueing for activities related to strengthening, flexibility, endurance, ROM for improvements in LE, proximal hip, and core control with self care, mobility, lifting, ambulation.   [x] (84270) Provided verbal/tactile cueing for activities related to improving balance, coordination, kinesthetic sense, posture, motor skill, proprioception  to assist with LE, proximal hip, face-to-face)  [] RE-EVAL     [x] YZ(10207) x 2     [] IONTO (01104)  [] NMR (38924) x     [] VASO (98911)  [x] Manual (54332) x 1    [] Other:  [] TA (38758)x     [] Mech Traction (23932)  [] ES(attended) (02256)     [] ES (un) (15834): If BWC Please Indicate Time In/Out  CPT Code Time in Time out                                   GOALS:  Patient stated goal: Be able to stand and walk without pain. [x]? Progressing: []? Met: []? Not Met: []? Adjusted     Therapist goals for Patient:   Short Term Goals: To be achieved in: 2 weeks  1. Independent in HEP and progression per patient tolerance, in order to prevent re-injury. [x]? Progressing: []? Met: []? Not Met: []? Adjusted  2. Patient will have a decrease in pain to facilitate improvement in movement, function, and ADLs as indicated by Functional Deficits. [x]? Progressing: []? Met: []? Not Met: []? Adjusted     Long Term Goals: To be achieved in: 6 weeks  1. Disability index score of 10% or less for the LEFS to assist with reaching prior level of function. [x]? Progressing: [x]? Met: []? Not Met: []? Adjusted  2. Patient will demonstrate increased L knee AROM to allow for proper joint functioning as indicated by patients Functional Deficits. [x]? Progressing: []? Met: []? Not Met: []? Adjusted   3. Patient will demonstrate an increase in strength to good proximal hip strength and control, within 5lb HHD in LE to allow for proper functional mobility as indicated by patients Functional Deficits. [x]? Progressing: []? Met: []? Not Met: []? Adjusted  4. Patient will be able to tolerate prolonged standing >30 minutes and walking >2 miles without increased symptoms or restriction. [x]? Progressing: []? Met: []? Not Met: []? Adjusted   5. Patient will be able to perform a full depth squat without increased symptoms or restriction. [x]? Progressing: []? Met: []? Not Met: []?  Adjusted         ASSESSMENT:  Continued ttp to L lateral knee, distal lateral hamstrings, and proximal lateral gastroc and anterior tibialis improved compared to previous. Continued addressing with STMob and proximal fibular head mobs. Session emphasis on simple quad activation exercises - particularly VMO activation - and hip strengthening tasks to decrease distal lateral knee pain and improve patellar tracking to address pain and deficits with prolonged standing, walking, squatting, and stair navigation. Return to Play: (if applicable)   []  Stage 1: Intro to Strength   []  Stage 2: Return to Run and Strength   []  Stage 3: Return to Jump and Strength   []  Stage 4: Dynamic Strength and Agility   []  Stage 5: Sport Specific Training     []  Ready to Return to Play, Meets All Above Stages   []  Not Ready for Return to Sports   Comments:            Treatment/Activity Tolerance:  [x] Patient tolerated treatment well [] Patient limited by fatique  [] Patient limited by pain  [] Patient limited by other medical complications  [] Other:     Overall Progression Towards Functional goals/ Treatment Progress Update:  [x] Patient is progressing as expected towards functional goals listed. [] Progression is slowed due to complexities/Impairments listed. [] Progression has been slowed due to co-morbidities. [] Plan just implemented, too soon to assess goals progression <30days   [] Goals require adjustment due to lack of progress  [] Patient is not progressing as expected and requires additional follow up with physician  [] Other    Prognosis for POC: [x] Good [] Fair  [] Poor    Patient requires continued skilled intervention: [x] Yes  [] No        PLAN: Begin PT addressing L knee ROM and joint mobility and proximal hip strength/stability as ton.   [x] Continue per plan of care [] Alter current plan (see comments)  [] Plan of care initiated [] Hold pending MD visit [] Discharge    Electronically signed by: Rosendo Jimenez PT    Note: If patient does not return for scheduled/recommended follow up

## 2020-02-24 ENCOUNTER — APPOINTMENT (OUTPATIENT)
Dept: PHYSICAL THERAPY | Age: 20
End: 2020-02-24
Payer: COMMERCIAL

## 2020-02-26 ENCOUNTER — HOSPITAL ENCOUNTER (OUTPATIENT)
Dept: PHYSICAL THERAPY | Age: 20
Setting detail: THERAPIES SERIES
Discharge: HOME | End: 2020-02-26
Payer: COMMERCIAL

## 2020-02-26 PROCEDURE — 97140 MANUAL THERAPY 1/> REGIONS: CPT

## 2020-02-26 PROCEDURE — 97112 NEUROMUSCULAR REEDUCATION: CPT

## 2020-02-26 PROCEDURE — 97110 THERAPEUTIC EXERCISES: CPT

## 2020-02-26 NOTE — FLOWSHEET NOTE
[facility] [address] [place] [address]  Phone: [phone] Fax: [phone]    Physical Therapy Treatment Note/ Progress Report:     Date:  2020    Patient Name:  Jean Rodriguez    :  2000  MRN: [id number]  Restrictions/Precautions:    Medical/Treatment Diagnosis Information:  · Diagnosis: M25.562 L knee pain  · Treatment Diagnosis: M25.562 L knee pain  Insurance/Certification information:  PT Insurance Information: [place] - 20 visits  Physician Information:  Referring Practitioner: Jayme Malik  Plan of care signed (Y/N):     Date of Patient follow up with Physician:      Progress Report: []  Yes  [x]  No     Date Range for reporting period:  Beginnin20  Ending:  NA    Progress report due (10 Rx/or 30 days whichever is less):      Recertification due (POC duration/ or 90 days whichever is less): 3/10/20     Visit # Insurance Allowable Auth Needed   8 [place] - 20 visits []Yes   [x]No     Latex Allergy:  [x]NO      []YES  Preferred Language for Healthcare:   [x]English       []other:  Functional Scale: 28% disability - LEFS ()  Date assessed:  20    Pain level:  2/10     SUBJECTIVE:  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 Patient notes increased L knee pain typically increasing after one full hour of standing. Patient reports that pain is no longer in back of her L knee or on the outside of her L shin  - more on the outside of her L knee and ITB just above the knee. OBJECTIVE: Trigger points noted in ant tib, lateral hamstring, and ITB. Addressed with [facility].  Observation:    Test measurements:      RESTRICTIONS/PRECAUTIONS: R labral debridement . Abdominal hernia repair . History of up to 5 concussions [id number].      Exercises/Interventions:     Therapeutic Ex (43597)   Min: 33 Sets/sec Reps Notes/CUES   BFR - strength protocol - 70% LOP  8 min 123mmHg - 30 SLR/15 sidelying hip abd/15 LAQ   Lateral TB walks - TB around ankles 2 30 [place] at ankles   BOSU lunge w/isometric hold - TB pulling medially Mod.   5sec 10    Squat taps to chair - TB around knees np     Bridges with TB  10s 15 orange   Leg press - DL - Ball squeeze for VMO- 85#  20 Eccentric emphasis   TRX squat isos - to pain free depth - maroon loop around knees 5s 10    Leg press quad isometrics np     Cybex quad isos np     [place] quad set 10s 10 Cues for improved VMO act   SLR flexion - 1# ankle weight  20    Sidelying hip abduction 2 10 2# ankle weight for SLR abd   Glute med iso w/SWB against wall 2 10    Gastoc/Soleus/HS stretch 30 3    Prone hip extension - flexed at EOB - 2# ankle weight 5s 10                Manual Intervention (18633)  Min:  15      L proximal fibula mobs - anterior glide  3 min Grade 2-3   L tibiofemoral glide - anterior glide  3 min Grade 2-3   L ER tibial mob - end range extension  3 min Grade 2-3   IASTM/STMob to lateral L knee/ITB  3 min    Passive L hamstring stretch  3 min    Patella taping for tracking  np                NMR Re-education (66655)  Min: 12   CUES NEEDED   Prone froggers - glut activation variations - DL, knees off mat, SL for individual act/coordination 5s 10 each variation Cues to decrease hamstring and lumbar activation   Clamshells sidelying  maroon 20    [place] - glut activation focus np     Lateral stepdowns on 4\" - TB pulling medially np     SLS tramp toss - 3-way  20 each position    Sport cord march fwd/bwd  10 each position    Glut step up on 4\" 2 10 Cues for increased glut act                     Therapeutic Activity (07178)  Min:  0      Ladders      [place]      Dynamic Balance                            Therapeutic Exercise and NMR EXR  [x] (82881) Provided verbal/tactile cueing for activities related to strengthening, flexibility, endurance, ROM for improvements in LE, proximal hip, swelling, pain control. Charges:  Timed Code Treatment Minutes: 60   Total Treatment Minutes: 60      [] EVAL (LOW) 14228 (typically 20 minutes face-to-face)  [] EVAL (MOD) 79196 (typically 30 minutes face-to-face)  [] EVAL (HIGH) 48585 (typically 45 minutes face-to-face)  [] RE-EVAL     [x] JT(25090) x 2     [] IONTO (57130)  [x] NMR (36601) x 1    [] VASO (64560)  [x] Manual (28474) x 1    [] Other:  [] TA (22322)x     [] Mech Traction (97074)  [] ES(attended) (54068)     [] ES (un) (11912): If BWC Please Indicate Time In/Out  CPT Code Time in Time out                                   GOALS:  Patient stated goal: Be able to stand and walk without pain. [x]? Progressing: []? Met: []? Not Met: []? Adjusted     Therapist goals for Patient:   Short Term Goals: To be achieved in: 2 weeks  1. Independent in HEP and progression per patient tolerance, in order to prevent re-injury. [x]? Progressing: []? Met: []? Not Met: []? Adjusted  2. Patient will have a decrease in pain to facilitate improvement in movement, function, and ADLs as indicated by Functional Deficits. [x]? Progressing: []? Met: []? Not Met: []? Adjusted     Long Term Goals: To be achieved in: 6 weeks  1. Disability index score of 10% or less for the LEFS to assist with reaching prior level of function. [x]? Progressing: [x]? Met: []? Not Met: []? Adjusted  2. Patient will demonstrate increased L knee AROM to allow for proper joint functioning as indicated by patients Functional Deficits. [x]? Progressing: []? Met: []? Not Met: []? Adjusted   3. Patient will demonstrate an increase in strength to good proximal hip strength and control, within 5lb HHD in LE to allow for proper functional mobility as indicated by patients Functional Deficits. [x]? Progressing: []? Met: []? Not Met: []? Adjusted  4. Patient will be able to tolerate prolonged standing >30 minutes and walking >2 miles without increased symptoms or restriction. [x]?

## 2020-03-04 ENCOUNTER — HOSPITAL ENCOUNTER (OUTPATIENT)
Dept: PHYSICAL THERAPY | Age: 20
Setting detail: THERAPIES SERIES
Discharge: HOME | End: 2020-03-04
Payer: COMMERCIAL

## 2020-03-04 PROCEDURE — 20560 NDL INSJ W/O NJX 1 OR 2 MUSC: CPT

## 2020-03-04 PROCEDURE — 97032 APPL MODALITY 1+ESTIM EA 15: CPT

## 2020-03-04 PROCEDURE — 97110 THERAPEUTIC EXERCISES: CPT

## 2020-03-04 PROCEDURE — 97140 MANUAL THERAPY 1/> REGIONS: CPT

## 2020-03-04 PROCEDURE — 97112 NEUROMUSCULAR REEDUCATION: CPT

## 2020-03-04 NOTE — FLOWSHEET NOTE
reduce muscle spasm and induce healing in the corresponding musculature. (48955)  Clean Technique was utilized today while applying Dry needling treatment. The treatment sites where cleaned with 70% solution of  isopropyl alcohol . The PT washed their hands and utilized treatment gloves along with hand  prior to inserting the needles. All needles where removed and discarded in the appropriate sharps container. MD has given verbal and/or written approval for this treatment. Attended low frequency (1-20Hz) electrical stimulation was utilized in conjunction with Dry Needling:  the Estim was manipulated between all above needles for a period of 10 min. at 2-4 volts. The low frequency electrical stimulation was used to help reduce muscle spasm and help to interrupt /[place] the pain cycle. (60521)     Therapeutic Exercise and NMR EXR  [x] (84199) Provided verbal/tactile cueing for activities related to strengthening, flexibility, endurance, ROM for improvements in LE, proximal hip, and core control with self care, mobility, lifting, ambulation. [x] (93752) Provided verbal/tactile cueing for activities related to improving balance, coordination, kinesthetic sense, posture, motor skill, proprioception  to assist with LE, proximal hip, and core control in self care, mobility, lifting, ambulation and eccentric single leg control.      NMR and Therapeutic Activities:    [x] (95209 or 57656) Provided verbal/tactile cueing for activities related to improving balance, coordination, kinesthetic sense, posture, motor skill, proprioception and motor activation to allow for proper function of core, proximal hip and LE with self care and ADLs and functional mobility.   [] (20767) Gait Re-education- Provided training and instruction to the patient for proper LE, core and proximal hip recruitment and positioning and eccentric body weight control with ambulation re-education including up and down stairs     Home Exercise Progressing: []? Met: []? Not Met: []? Adjusted  2. Patient will have a decrease in pain to facilitate improvement in movement, function, and ADLs as indicated by Functional Deficits. [x]? Progressing: []? Met: []? Not Met: []? Adjusted     Long Term Goals: To be achieved in: 6 weeks  1. Disability index score of 10% or less for the LEFS to assist with reaching prior level of function. [x]? Progressing: [x]? Met: []? Not Met: []? Adjusted  2. Patient will demonstrate increased L knee AROM to allow for proper joint functioning as indicated by patients Functional Deficits. [x]? Progressing: []? Met: []? Not Met: []? Adjusted   3. Patient will demonstrate an increase in strength to good proximal hip strength and control, within 5lb HHD in LE to allow for proper functional mobility as indicated by patients Functional Deficits. [x]? Progressing: []? Met: []? Not Met: []? Adjusted  4. Patient will be able to tolerate prolonged standing >30 minutes and walking >2 miles without increased symptoms or restriction. [x]? Progressing: []? Met: []? Not Met: []? Adjusted   5. Patient will be able to perform a full depth squat without increased symptoms or restriction. [x]? Progressing: []? Met: []? Not Met: []? Adjusted         ASSESSMENT:  Initiated DN to L distal VL and VMO to improve VMO activation and decrease discomfort/pain to lateral L knee. Continued emphasis on VMO activation and hip strengthening tasks to improve patellar tracking to address pain and deficits with prolonged standing and walking.      Return to Play: (if applicable)   []  Stage 1: Intro to Strength   []  Stage 2: Return to Run and Strength   []  Stage 3: Return to Jump and Strength   []  Stage 4: Dynamic Strength and Agility   []  Stage 5: Sport Specific Training     []  Ready to Return to Play, Meets All Above Stages   []  Not Ready for Return to Sports   Comments:            Treatment/Activity Tolerance:  [x] Patient tolerated treatment

## 2020-03-06 ENCOUNTER — HOSPITAL ENCOUNTER (OUTPATIENT)
Dept: PHYSICAL THERAPY | Age: 20
Setting detail: THERAPIES SERIES
Discharge: HOME | End: 2020-03-06
Payer: COMMERCIAL

## 2020-03-11 ENCOUNTER — HOSPITAL ENCOUNTER (OUTPATIENT)
Dept: PHYSICAL THERAPY | Age: 20
Setting detail: THERAPIES SERIES
Discharge: HOME | End: 2020-03-11
Payer: COMMERCIAL

## 2020-03-11 PROCEDURE — 97110 THERAPEUTIC EXERCISES: CPT

## 2020-03-11 PROCEDURE — 97032 APPL MODALITY 1+ESTIM EA 15: CPT

## 2020-03-11 PROCEDURE — 97112 NEUROMUSCULAR REEDUCATION: CPT

## 2020-03-11 PROCEDURE — 97140 MANUAL THERAPY 1/> REGIONS: CPT

## 2020-03-11 PROCEDURE — 20560 NDL INSJ W/O NJX 1 OR 2 MUSC: CPT

## 2020-03-11 NOTE — FLOWSHEET NOTE
[place] [address] [place] Yulissa Fountain  Phone: [phone] Fax: [phone]        Physical Therapy Re-Certification Plan of Care/MD UPDATE      Dear Riley Suh,    We had the pleasure of treating the following patient for physical therapy services at [address] [place] [address]. A summary of our findings can be found in the updated assessment below. This includes our plan of care. If you have any questions or concerns regarding these findings, please do not hesitate to contact me at the office phone number checked above.   Thank you for the referral.     Physician Signature:________________________________Date:__________________  By signing above (or electronic signature), therapists plan is approved by physician    Date Range Of Visits: 20 to 3/11/20  Total Visits to Date: 10  Overall Response to Treatment:   [x]Patient is responding well to treatment and improvement is noted with regards  to goals   []Patient should continue to improve in reasonable time if they continue HEP   []Patient has plateaued and is no longer responding to skilled PT intervention    []Patient is getting worse and would benefit from return to referring MD   []Patient unable to adhere to initial POC   []Other:     Physical Therapy Treatment Note/ Progress Report:     Date:  3/11/2020    Patient Name:  Jada Garcia    :  2000  MRN: [id number]  Restrictions/Precautions:    Medical/Treatment Diagnosis Information:  · Diagnosis: M25.562 L knee pain  · Treatment Diagnosis: M25.562 L knee pain  Insurance/Certification information:  PT Insurance Information: [facility] - 20 visits  Physician Information:  Referring Practitioner: Riley Suh  Plan of care signed (Y/N):     Date of Patient follow up with Physician:      Progress Report: [x]  Yes  []  No     Date Range for reporting period:  Beginnin20  Ending:  NA    Progress report due (10 Rx/or 30 days whichever is less): 4/38/64     Recertification due (POC duration/ or 90 days whichever is less): 4/11/20     Visit # Insurance Allowable Auth Needed   [facility] [place] - 20 visits []Yes   [x]No     Latex Allergy:  [x]NO      []YES  Preferred Language for Healthcare:   [x]English       []other:  Functional Scale: 28% disability - LEFS (58/80) - TBA       Date assessed:  3/11/20    Pain level:  0/10     SUBJECTIVE:  Patient feels like dry needling helped previous session. Patient was able to tolerate 4 hours of prolonged standing for her 5 hour work shift following previous session where she was previously only tolerating 3 hours prior to onset of L knee pain even in supportive shoewear. OBJECTIVE:    Observation:    Test measurements:      (3/11/20):  ROM LEFT RIGHT   HIP Flex 100 100   HIP Abd       HIP Ext       HIP IR 52 50   HIP ER 31 31   Knee ext +5 Hyper p! Only after maintaining a position of hyperextension for a prolonged period. +5 Hyper   Knee Flex 140/142 142/145   Ankle PF       Ankle DF       Ankle In       Ankle Ev       Strength  LEFT RIGHT   HIP Flexors 4/5 4/5   HIP Abductors 4-/5  4-/5   HIP Ext 4/5 No longer using lumbar compensatory strategy. Improved glut activation. 4/5 No longer using lumbar compensatory strategy. Improved glut activation. Hip ER       Knee EXT (quad) 4/5  5/5   Knee Flex (HS) 4/5 4/5   Ankle DF       Ankle PF       Ankle Inv       Ankle EV               Circumference  Mid apex  7 cm prox       No edema evident.     No edema evident. SLS:  Equal pelvis height bilaterally. Squat:  Utilizes hip hinge and knee strategy equally; able to achieve full depth; pain in L knee when she gets to full depth that resolves when she comes out of that position; knee valgus collapse at full depth, but improved compared to previous. RESTRICTIONS/PRECAUTIONS: R labral debridement 2015. Abdominal hernia repair 2002. History of up to 5 concussions 3812-8873. IActive      Dynamic Balance                          Spoke with Dr. Aisha Calle regarding the use of Dry Needling     Dry needling manual therapy: consisted on the placement of 4 needles in the following muscles:  L distal VMO, L distal VL. A 30 mm needle was inserted, piston, rotated, and coned to produce intramuscular mobilization. These techniques were used to restore functional range of motion, reduce muscle spasm and induce healing in the corresponding musculature. (54210)  Clean Technique was utilized today while applying Dry needling treatment. The treatment sites where cleaned with 70% solution of  isopropyl alcohol . The PT washed their hands and utilized treatment gloves along with hand  prior to inserting the needles. All needles where removed and discarded in the appropriate sharps container. MD has given verbal and/or written approval for this treatment. Attended low frequency (1-20Hz) electrical stimulation was utilized in conjunction with Dry Needling:  the Estim was manipulated between all above needles for a period of 10 min. at 2-4 volts. The low frequency electrical stimulation was used to help reduce muscle spasm and help to interrupt /[place] the pain cycle. (46668)     Therapeutic Exercise and NMR EXR  [x] (46629) Provided verbal/tactile cueing for activities related to strengthening, flexibility, endurance, ROM for improvements in LE, proximal hip, and core control with self care, mobility, lifting, ambulation. [x] (34148) Provided verbal/tactile cueing for activities related to improving balance, coordination, kinesthetic sense, posture, motor skill, proprioception  to assist with LE, proximal hip, and core control in self care, mobility, lifting, ambulation and eccentric single leg control.      NMR and Therapeutic Activities:    [x] (59560 or 45288) Provided verbal/tactile cueing for activities related to improving balance, coordination, kinesthetic sense, posture, motor skill, proprioception and motor activation to allow for proper function of core, proximal hip and LE with self care and ADLs and functional mobility.   [] (75594) Gait Re-education- Provided training and instruction to the patient for proper LE, core and proximal hip recruitment and positioning and eccentric body weight control with ambulation re-education including up and down stairs     Home Exercise Program:    [] (37174) Reviewed/Progressed HEP activities related to strengthening, flexibility, endurance, ROM of core, proximal hip and LE for functional self-care, mobility, lifting and ambulation/stair navigation   [] (42539)Reviewed/Progressed HEP activities related to improving balance, coordination, kinesthetic sense, posture, motor skill, proprioception of core, proximal hip and LE for self care, mobility, lifting, and ambulation/stair navigation      Manual Treatments:  PROM / STM / Oscillations-Mobs:  G-I, II, III, IV (PA's, Inf., Post.)  [x] (77386) Provided manual therapy to mobilize LE, proximal hip and/or LS spine soft tissue/joints for the purpose of modulating pain, promoting relaxation,  increasing ROM, reducing/eliminating soft tissue swelling/inflammation/restriction, improving soft tissue extensibility and allowing for proper ROM for normal function with self care, mobility, lifting and ambulation. Modalities:  None.   [] GAME READY (VASO)- for significant edema, swelling, pain control.      Charges:  Timed Code Treatment Minutes: 45   Total Treatment Minutes: 60      [] EVAL (LOW) 67824 (typically 20 minutes face-to-face)  [] EVAL (MOD) 28840 (typically 30 minutes face-to-face)  [] EVAL (HIGH) 64057 (typically 45 minutes face-to-face)  [] RE-EVAL     [x] YE(38651) x 1     [] IONTO (48503)  [x] NMR (12042) x 1    [] VASO (36264)  [x] Manual (98920) x 1    [x] Other:  DN - 1 or 2 muscles (72797)  [] TA (46571)x     [] Mech Traction (00219)  [x] ES(attended) (19166)     [] ES (un) (45795): If BW Please Indicate Time In/Out  CPT Code Time in Time out                                   GOALS:  Patient stated goal: Be able to stand and walk without pain. [x]? Progressing: []? Met: []? Not Met: []? Adjusted     Therapist goals for Patient:   Short Term Goals: To be achieved in: 2 weeks  1. Independent in HEP and progression per patient tolerance, in order to prevent re-injury. []? Progressing: [x]? Met: []? Not Met: []? Adjusted  2. Patient will have a decrease in pain to facilitate improvement in movement, function, and ADLs as indicated by Functional Deficits. []? Progressing: [x]? Met: []? Not Met: []? Adjusted     Long Term Goals: To be achieved in: 6 weeks  1. Disability index score of 10% or less for the LEFS to assist with reaching prior level of function. [x]? Progressing: []? Met: []? Not Met: []? Adjusted  Comment:  TBA  2. Patient will demonstrate increased L knee AROM to allow for proper joint functioning as indicated by patients Functional Deficits. []? Progressing: [x]? Met: []? Not Met: []? Adjusted    3. Patient will demonstrate an increase in strength to good proximal hip strength and control, within 5lb HHD in LE to allow for proper functional mobility as indicated by patients Functional Deficits. [x]? Progressing: []? Met: []? Not Met: []? Adjusted  Comment:  See objective measures above. 4. Patient will be able to tolerate prolonged standing >30 minutes and walking >2 miles without increased symptoms or restriction. [x]? Progressing: []? Met: []? Not Met: []? Adjusted   Comment:  Patient able to tolerate 4 hours of standing at work without increased L knee pain. 5. Patient will be able to perform a full depth squat without increased symptoms or restriction. [x]? Progressing: []? Met: []? Not Met: []? Adjusted     Comment:  Able to achieve full depth, but painful just at full depth position likely due to valgus collapse still present at full depth.     ASSESSMENT: Patient demonstrates full pain free L knee flexion and extension ROM. Noted improved global hip strength and stability compared to previous measures. Patient demonstrates improved mechanics and tolerance to squatting. She is able to reach full squat depth noting pain only in full depth position that resolves when she comes out of that position likely more of a strength and motor control issue. Major factors still affecting patient's function likely remaining hip strength, VMO activation/strength, and motor control deficits. Patient reports positive response to DN previous session noting improved standing tolerance at work. Continued DN to L distal VL and VMO to improve VMO activation and decrease discomfort/pain to lateral L knee. Patient tolerated progression of VMO activation and hip strengthening tasks  Today to improve patellar tracking and pain with prolonged standing and walking. Return to Play: (if applicable)   []  Stage 1: Intro to Strength   []  Stage 2: Return to Run and Strength   []  Stage 3: Return to Jump and Strength   []  Stage 4: Dynamic Strength and Agility   []  Stage 5: Sport Specific Training     []  Ready to Return to Play, Meets All Above Stages   []  Not Ready for Return to Sports   Comments:            Treatment/Activity Tolerance:  [x] Patient tolerated treatment well [] Patient limited by fatique  [] Patient limited by pain  [] Patient limited by other medical complications  [] Other:     Overall Progression Towards Functional goals/ Treatment Progress Update:  [x] Patient is progressing as expected towards functional goals listed. [] Progression is slowed due to complexities/Impairments listed. [] Progression has been slowed due to co-morbidities.   [] Plan just implemented, too soon to assess goals progression <30days   [] Goals require adjustment due to lack of progress  [] Patient is not progressing as expected and requires additional follow up with physician  []

## 2020-03-18 ENCOUNTER — HOSPITAL ENCOUNTER (OUTPATIENT)
Dept: PHYSICAL THERAPY | Age: 20
Setting detail: THERAPIES SERIES
Discharge: HOME | End: 2020-03-18
Payer: COMMERCIAL

## 2020-03-18 NOTE — FLOWSHEET NOTE
[facility]    Physical Therapy  Cancellation/No-show Note  Patient Name:  Nahomy Ewing  :  2000   Date:  3/18/2020  Cancelled visits to date: 1  No-shows to date: 0    For today's appointment patient:  [x]  Cancelled  []  Rescheduled appointment  []  No-show     Reason given by patient:  []  Patient ill  []  Conflicting appointment  []  No transportation    []  Conflict with work  []  No reason given  [x]  Other:     Comments:  Patient cancelled due to COVID-19 concerns.     Electronically signed by:  Gabriel Tadeo PT

## 2020-03-20 ENCOUNTER — APPOINTMENT (OUTPATIENT)
Dept: PHYSICAL THERAPY | Age: 20
End: 2020-03-20
Payer: COMMERCIAL

## 2020-11-18 ENCOUNTER — OFFICE VISIT (OUTPATIENT)
Dept: PRIMARY CARE CLINIC | Age: 20
End: 2020-11-18
Payer: COMMERCIAL

## 2020-11-18 PROCEDURE — 99211 OFF/OP EST MAY X REQ PHY/QHP: CPT | Performed by: NURSE PRACTITIONER

## 2020-11-18 NOTE — PROGRESS NOTES
Connor Dawson received a viral test for COVID-19. They were educated on isolation and quarantine as appropriate. For any symptoms, they were directed to seek care from their PCP, given contact information to establish with a doctor, directed to an urgent care or the emergency room.

## 2020-11-21 LAB — SARS-COV-2, NAA: NOT DETECTED

## 2020-11-24 ENCOUNTER — NURSE TRIAGE (OUTPATIENT)
Dept: OTHER | Facility: CLINIC | Age: 20
End: 2020-11-24

## 2020-11-24 ENCOUNTER — OFFICE VISIT (OUTPATIENT)
Dept: FAMILY MEDICINE CLINIC | Age: 20
End: 2020-11-24
Payer: COMMERCIAL

## 2020-11-24 VITALS
BODY MASS INDEX: 25.15 KG/M2 | DIASTOLIC BLOOD PRESSURE: 84 MMHG | HEART RATE: 90 BPM | WEIGHT: 142 LBS | TEMPERATURE: 98 F | SYSTOLIC BLOOD PRESSURE: 137 MMHG

## 2020-11-24 PROCEDURE — 99212 OFFICE O/P EST SF 10 MIN: CPT | Performed by: PHYSICIAN ASSISTANT

## 2020-11-24 ASSESSMENT — ENCOUNTER SYMPTOMS
TROUBLE SWALLOWING: 0
NAUSEA: 0
DIARRHEA: 0
VOMITING: 0
SORE THROAT: 0
WHEEZING: 0
SHORTNESS OF BREATH: 0

## 2020-11-24 NOTE — PATIENT INSTRUCTIONS
Grazyna Long was seen today for urticaria. Diagnoses and all orders for this visit:    Hives       Take Zyrtec in the morning then benadryl at night if needed or just stick to benadryl every 4-6 hours. Need to make appointment with allergist. Mike Frazier as well daily for a couple weeks. MD Laureano Ruff MD  Allergy and [facility] [address]. Guo Cassandra  [place], [address] [facility] [address]  [phone] (phone)    Pacheco Prince MD  [facility]  [phone]  [address] [facility] [address], [facility] [address]  [facility], [address] [facility]    [facility]  MD Theo Case MD Edris Covey, MD  [address] [facility], . [address]  [phone]    Allergy and Simuel Leda Baum  [facility] [address]  [place] [address]  Tel: [phone]    Dr. Maria Terrazas  [facility] [address], [address]  [address]MD Meet MD  [address] [facility] [address] [facility] [address]  [place], [facility] [address]  [phone]

## 2020-11-24 NOTE — TELEPHONE ENCOUNTER
[facility]. Brief description of triage: hives    Triage indicates for patient to be seen in office today    Care advice provided, patient verbalizes understanding; denies any other questions or concerns; instructed to call back for any new or worsening symptoms. Writer provided warm transfer to Luis Fernando at [facility] for appointment scheduling. Attention Provider: Thank you for allowing me to participate in the care of your patient. The patient was connected to triage in response to information provided to the [facility]. Please do not respond through this encounter as the response is not directed to a shared pool. Reason for Disposition   Hives suspected   MODERATE-SEVERE hives persist (i.e., hives interfere with normal activities or work) and taking antihistamine (e.g., Benadryl, Claritin) > 24 hours    Answer Assessment - Initial Assessment Questions  1. APPEARANCE of RASH: \"Describe the rash. \" (e.g., spots, blisters, raised areas, skin peeling, scaly)      Redness with white raised bumps    2. SIZE: \"How big are the spots? \" (e.g., tip of pen, eraser, coin; inches, centimeters)      From pencil eraser size to quarter size    3. LOCATION: \"Where is the rash located? \"      Both legs from top to bottom, both arms and now face    4. COLOR: \"What color is the rash? \" (Note: It is difficult to assess rash color in people with darker-colored skin. When this situation occurs, simply ask the caller to describe what they see.)      Red    5. ONSET: \"When did the rash begin? \"      Yesterday    6. FEVER: \"Do you have a fever? \" If so, ask: \"What is your temperature, how was it measured, and when did it start? \"      No    7. ITCHING: \"Does the rash itch? \" If so, ask: \"How bad is the itch? \" (Scale 1-10; or mild, moderate, severe)      6/10- 8/10    8. CAUSE: \"What do you think is causing the rash? \"      Unsure.  May be detergent, but has used same kind in the past.    9. MEDICATION FACTORS: \"Have you started any new medications within the last 2 weeks? \" (e.g., antibiotics)       No    10. OTHER SYMPTOMS: \"Do you have any other symptoms? \" (e.g., dizziness, headache, sore throat, joint pain)        No    11. PREGNANCY: \"Is there any chance you are pregnant? \" \"When was your last menstrual period? \"        LMP should start any day now    Protocols used: RASH OR REDNESS - WIDESPREAD-ADULT-OH, HIVES-ADULT-[place]

## 2020-11-24 NOTE — PROGRESS NOTES
Subjective:      Patient ID: Damon Lujan is a 23 y.o. female. HPI Patient is here today for hives that started yesterday. She woke up, ate a bagel with cream cheese and went to work. The hives started shortly after she got to work. They were everywhere, raised welts that were itchy. Then they started fading eventually. Then woke up with them again today but mostly on her legs today. No trouble breathing or swallowing. No ST. She has not eating or drank anything new. She has used her mom's detergent that she hasn't used in a month or so. But she used it for years before that. She does not take any medicines. Review of Systems   Constitutional: Negative for appetite change and fever. HENT: Negative for sore throat and trouble swallowing. Respiratory: Negative for shortness of breath and wheezing. Cardiovascular: Negative for palpitations. Gastrointestinal: Negative for diarrhea, nausea and vomiting. Skin: Positive for rash. Neurological: Negative for dizziness and headaches. Objective:   Physical Exam  Vitals signs reviewed. Constitutional:       Appearance: Normal appearance. She is well-developed and well-groomed. Skin:     Comments: Mostly on legs right now with fading welts, blanches, slightly itchy but not as bad as when they are flare up   Neurological:      Mental Status: She is alert and oriented to person, place, and time. Psychiatric:         Attention and Perception: Attention normal.         Mood and Affect: Mood normal.         Speech: Speech normal.         Behavior: Behavior normal. Behavior is cooperative. Assessment:      Julien So was seen today for urticaria. Diagnoses and all orders for this visit:    Hives             Plan:     Take zyrtec in the morning and benadryl at night or just stick to benadryl every 4-6 hours, see allergist. Take Pepcid daily as well for another 7-14 days.          Anderson Abernathy

## 2021-02-03 ENCOUNTER — TELEPHONE (OUTPATIENT)
Dept: FAMILY MEDICINE CLINIC | Age: 21
End: 2021-02-03

## 2021-02-03 NOTE — TELEPHONE ENCOUNTER
Just Tylenol or ibuprofen and stay hydrated and anything else otc that would support any other symptoms. Nothing prescription necessary at this time unless it progresses into something else in the next 1-2 weeks.

## 2022-07-21 NOTE — PLAN OF CARE
Yulissa Cruz  Phone: [phone]   Fax:     [phone]                                                       Physical Therapy Certification    Dear Referring Practitioner: Natalee Bruner,    We had the pleasure of evaluating the following patient for physical therapy services at [address] [place] [address]. A summary of our findings can be found in the initial assessment below. This includes our plan of care. If you have any questions or concerns regarding these findings, please do not hesitate to contact me at the office phone number checked above. Thank you for the referral.       Physician Signature:_______________________________Date:__________________  By signing above (or electronic signature), therapists plan is approved by physician      Patient: Omi Gregorio   : 2000   MRN: [id number]  Referring Physician: Referring Practitioner: Natalee Bruner      Evaluation Date: 2020      Medical Diagnosis Information:  Diagnosis: M25.562 L knee pain   Treatment Diagnosis: M25.562 L knee pain                                         Insurance information: PT Insurance Information: [employer] - 20 visits     Precautions/ Contra-indications: R labral debridement . Abdominal hernia repair . History of up to 5 concussions [id number]. Latex Allergy:  [x]NO      []YES  Preferred Language for Healthcare:   [x]English       []other:    SUBJECTIVE: Patient stated complaint:  Patient's L knee pain first started back in 2019. Patient remembers kicking her L knee out to get her roommate's attention when she felt her L knee pop causing increased pain. She then tried to do a cartwheel a week later and she had a second pop that caused more L knee pain.  Patient now has intermittent, varying dull achiness with intermittent sharp pain with certain activities. Patient's L knee pain is located on the L lateral and posterolateral aspect of the knee. Relevant Medical History: R labral debridement 2015. Abdominal hernia repair 2002. History of up to 5 concussions [id number]. Functional Scale/Score:  27% disability - LEFS (58/80)    Pain Scale: 1-2/10 at start of session. 7-8/10 at worst. 0/10 at best.   Easing factors: Changing positions often. Icing. Advil. Avoiding activities that aggravate pain. Provocative factors: Full depth squatting. Getting out of a chair after sitting for longer than 30 minutes. Prolonged walking >1 mile. Quickly turning/pivoting. Locking her knees out. Having her L knee fully bent. Prolonged standing >10 minutes. Type: []Constant   [x]Intermittent  []Radiating [x]Localized []other:     Numbness/Tingling: Denies N/T. Occupation/School:  Patient is a senior at [employer]. Living Status/Prior Level of Function: Independent with ADLs and IADLs. Patient would like to get back to going to the rec center at least 1x per week to do a combination of cardio, free weights, and machines for approximately 30 minutes. OBJECTIVE:     ROM LEFT RIGHT   HIP Flex 100 100   HIP Abd     HIP Ext     HIP IR 52 50   HIP ER 31 31   Knee ext +5 Hyper p! +5 Hyper   Knee Flex 130 142   Ankle PF     Ankle DF     Ankle In     Ankle Ev     Strength  LEFT RIGHT   HIP Flexors 4-/5 4-/5   HIP Abductors 3/5 Discomfort in L lateral hip. 4-/5   HIP Ext 4-/5 Utilizes lumbar extension compensatory strategy. 4-/5 Utilizes lumbar extension compensatory strategy. Hip ER     Knee EXT (quad) 4/5 p! 5/5   Knee Flex (HS) 4/5 p! 4/5   Ankle DF     Ankle PF     Ankle Inv     Ankle EV          Circumference  Mid apex  7 cm prox     No edema evident. No edema evident. Balance:  SLS:  Trendelenberg on L when in R SLS; increased difficulty when in L SLS.     Reflexes/Sensation:    [x]Dermatomes/Myotomes intact [x]Reflexes equal and normal bilaterally   []Other:    Joint mobility: Patella mobility; bilateral hip mobility   [x]Normal    []Hypo   []Hyper    Palpation: Patient ttp to L lateral joint line, distal ITB, distal VL. Functional Mobility/Transfers:   Squat:  Achieves 82 degrees of depth prior to onset of L knee pain; utilizes combination of hip and knee strategy equally; increased chest collapse. Posture: Glut hollowing bilaterally; decreased gluteal muscle bulk and tone bilaterally; decreased lumbar lordosis in staticu standing; increased bilateral knee hyperextension in static standing; minimal R foot ER in static standing; decreased transverse medial arch height, but not severe. Bandages/Dressings/Incisions: NA    Gait: Trendelenberg present bilaterally R>L when in opposite limb stance; increased transverse plane motion during swing phase of gait bilaterally. Orthopedic Special Tests: NA                       [x] Patient history, allergies, meds reviewed. Medical chart reviewed. See intake form. Review Of Systems (ROS):  [x]Performed Review of systems (Integumentary, CardioPulmonary, Neurological) by intake and observation. Intake form has been scanned into medical record. Patient has been instructed to contact their primary care physician regarding ROS issues if not already being addressed at this time.       Co-morbidities/Complexities (which will affect course of rehabilitation):   []None           Arthritic conditions   []Rheumatoid arthritis (M05.9)  []Osteoarthritis (M19.91)   Cardiovascular conditions   []Hypertension (I10)  []Hyperlipidemia (E78.5)  []Angina pectoris (I20)  []Atherosclerosis (I70)  []CVA Musculoskeletal conditions   []Disc pathology   []Congenital spine pathologies   [x]Prior surgical intervention  []Osteoporosis (M81.8)  []Osteopenia (M85.8)   Endocrine conditions   []Hypothyroid (E03.9)  []Hyperthyroid Gastrointestinal conditions   []Constipation (E52.10)   Metabolic Adjusted    Therapist goals for Patient:   Short Term Goals: To be achieved in: 2 weeks  1. Independent in HEP and progression per patient tolerance, in order to prevent re-injury. [x] Progressing: [] Met: [] Not Met: [] Adjusted  2. Patient will have a decrease in pain to facilitate improvement in movement, function, and ADLs as indicated by Functional Deficits. [x] Progressing: [] Met: [] Not Met: [] Adjusted    Long Term Goals: To be achieved in: 6 weeks  1. Disability index score of 10% or less for the LEFS to assist with reaching prior level of function. [x] Progressing: [x] Met: [] Not Met: [] Adjusted  2. Patient will demonstrate increased L knee AROM to allow for proper joint functioning as indicated by patients Functional Deficits. [x] Progressing: [] Met: [] Not Met: [] Adjusted   3. Patient will demonstrate an increase in strength to good proximal hip strength and control, within 5lb HHD in LE to allow for proper functional mobility as indicated by patients Functional Deficits. [x] Progressing: [] Met: [] Not Met: [] Adjusted  4. Patient will be able to tolerate prolonged standing >30 minutes and walking >2 miles without increased symptoms or restriction. [x] Progressing: [] Met: [] Not Met: [] Adjusted   5. Patient will be able to perform a full depth squat without increased symptoms or restriction.   [x] Progressing: [] Met: [] Not Met: [] Adjusted     Electronically signed by:  Basilio Lopez, PT (2) more than 100 beats/min

## 2022-11-01 ENCOUNTER — OFFICE VISIT (OUTPATIENT)
Dept: FAMILY MEDICINE CLINIC | Age: 22
End: 2022-11-01
Payer: COMMERCIAL

## 2022-11-01 VITALS — HEART RATE: 93 BPM | WEIGHT: 129 LBS | TEMPERATURE: 97.9 F | OXYGEN SATURATION: 98 % | BODY MASS INDEX: 22.85 KG/M2

## 2022-11-01 DIAGNOSIS — B35.9 RINGWORM: Primary | ICD-10-CM

## 2022-11-01 PROCEDURE — 99213 OFFICE O/P EST LOW 20 MIN: CPT | Performed by: NURSE PRACTITIONER

## 2022-11-01 RX ORDER — CLOTRIMAZOLE AND BETAMETHASONE DIPROPIONATE 10; .64 MG/G; MG/G
CREAM TOPICAL
Qty: 45 G | Refills: 0 | Status: SHIPPED | OUTPATIENT
Start: 2022-11-01

## 2022-11-01 ASSESSMENT — PATIENT HEALTH QUESTIONNAIRE - PHQ9
SUM OF ALL RESPONSES TO PHQ QUESTIONS 1-9: 0
SUM OF ALL RESPONSES TO PHQ9 QUESTIONS 1 & 2: 0
2. FEELING DOWN, DEPRESSED OR HOPELESS: 0
1. LITTLE INTEREST OR PLEASURE IN DOING THINGS: 0
SUM OF ALL RESPONSES TO PHQ QUESTIONS 1-9: 0

## 2022-11-01 NOTE — PROGRESS NOTES
Malena Espinoza  : 2000  Encounter date: 2022    This carmel 23 y.o. female who presents with  Chief Complaint   Patient presents with    Other         History of present illness:    HPI Pt is 23year old female with concerns regarding lesion on leg, recently had cold like symptoms but since resolved. Pt reports recently hiking in [place], concerned about possible tick bite. Report cold like symptoms have resolved. No current outpatient medications on file prior to visit. No current facility-administered medications on file prior to visit. No Known Allergies  Past Medical History:   Diagnosis Date    H/O multiple concussions       Past Surgical History:   Procedure Laterality Date    DENTAL SURGERY      HERNIA REPAIR      SHOULDER ARTHROSCOPY Right 12/21/15      Family History   Problem Relation Age of Onset    Cancer Maternal Grandmother         breast      Social History     Tobacco Use    Smoking status: Never    Smokeless tobacco: Never   Substance Use Topics    Alcohol use: No     Alcohol/week: 0.0 standard drinks        Review of Systems    Objective:    Pulse 93   Temp 97.9 °F (36.6 °C) (Tympanic)   Wt 129 lb (58.5 kg)   SpO2 98%   BMI 22.85 kg/m²   Weight: 129 lb (58.5 kg)     BP Readings from Last 3 Encounters:   20 137/84   20 120/74   20 114/76     Wt Readings from Last 3 Encounters:   22 129 lb (58.5 kg)   20 142 lb (64.4 kg)   20 140 lb (63.5 kg)     BMI Readings from Last 3 Encounters:   22 22.85 kg/m²   20 25.15 kg/m²   20 24.80 kg/m²       Physical Exam  Vitals reviewed. Constitutional:       Appearance: Normal appearance. She is well-developed. Cardiovascular:      Rate and Rhythm: Normal rate and regular rhythm. Pulses: Normal pulses. Heart sounds: Normal heart sounds. No murmur heard. Pulmonary:      Effort: Pulmonary effort is normal.      Breath sounds: Normal breath sounds.    Skin:     General: Skin is warm and dry. Findings: Erythema and lesion (L upper anterior thigh 1 cm annular lesion, peeling, itchy) present. Neurological:      Mental Status: She is alert and oriented to person, place, and time. Assessment/Plan    1. Ringworm  - clotrimazole-betamethasone (LOTRISONE) 1-0.05 % cream; Apply topically 2 times daily. Dispense: 45 g; Refill: 0    Return in about 1 month (around 12/1/2022) for annual check up. This dictation was generated by voice recognition computer software. Although all attempts are made to edit the dictation for accuracy, there may be errors in the transcription that are not intended.

## 2022-12-15 ENCOUNTER — OFFICE VISIT (OUTPATIENT)
Dept: FAMILY MEDICINE CLINIC | Age: 22
End: 2022-12-15
Payer: COMMERCIAL

## 2022-12-15 VITALS — OXYGEN SATURATION: 98 % | HEART RATE: 130 BPM | TEMPERATURE: 101.9 F

## 2022-12-15 DIAGNOSIS — R50.9 FEVER, UNSPECIFIED FEVER CAUSE: Primary | ICD-10-CM

## 2022-12-15 DIAGNOSIS — Z20.822 SUSPECTED COVID-19 VIRUS INFECTION: ICD-10-CM

## 2022-12-15 LAB
INFLUENZA A ANTIGEN, POC: NORMAL
INFLUENZA B ANTIGEN, POC: NORMAL

## 2022-12-15 PROCEDURE — 87804 INFLUENZA ASSAY W/OPTIC: CPT | Performed by: FAMILY MEDICINE

## 2022-12-15 PROCEDURE — 99213 OFFICE O/P EST LOW 20 MIN: CPT | Performed by: FAMILY MEDICINE

## 2022-12-15 NOTE — PROGRESS NOTES
12/15/2022  Luigi Mark (:  2000)    Allergies: No Known Allergies      FLU/RESPIRATORY/COVID-19 CLINIC EVALUATION    HPI:   Chief Complaint   Patient presents with    Cough        SYMPTOMS:    INSTRUCTIONS:  \"[x]\" Indicates a positive item  \"[]\" Indicates a negative item        Symptom duration, days:    Date symptoms started : ____________    [] 1   [x] 2   [] 3   [] 4 - 7   [] 8 - 10   [] 11 - 13   [] >14    [x] Fevers    [] Symptom (not measured)  [] Measured (Result:  degrees)  [x] Chills  [x] Cough [x] Dry [] Productive   []Loss of Taste  [] Loss of Smell  []Decreased Appetite  [] Coughing up blood  }  [] Chest Congestion  [] Nasal Congestion  [] Runny  Nose  [] Sneezing  [] Feeling short of breath   []Sometimes    [] Frequently    [] All the time     [] Chest pain     [x] Headaches  []Tolerable  [] Severe     [x] Fatigue  [x] Sore throat  [x] Muscle aches  [x] Nausea  [] Vomiting  []Unable to keep fluids down     [x] Diarrhea  [] Mild  []Severe       [] Vaccinated for COVID 19  [] History of COVID 19 (Date:           )      [] OTHER SYMPTOMS:      Symptom course:   [] Worsening     [] Stable     [] Improving      RISK FACTORS:1INSTRUCTIONS:  \"[x]\" Indicates a positive item. Negative  for risk factors if not checked.     [] Close contact with a lab confirmed COVID-19 patient within 14 days of symptom onset  [] History of travel from affected geographical areas within 14 days of symptom onset        PHYSICAL EXAMINATION:    Vitals:    12/15/22 1513   Pulse: (!) 130   Temp: (!) 101.9 °F (38.8 °C)   SpO2: 98%          [x] Alert  [] Oriented to person/place/time    [x] No apparent distress   [] Toxic appearing  [] Face flushed appearing     [x] Normal Mood  [] Anxious appearing      [] Sclera clear    [x] Pinna, TMs,  Canals normal bilaterally  [] TM Red  [] Right [] Left [] Bilateral  [] TM Bulging [] Right [] Left []  Billateral    [x] Oropharynx [x] Clear [] Red [] Exudate [] Swollen    [x] No adenopathy [] Adenopathy __________    [x] Lungs clear with good movement and effort  [x] Breathing appears normal     [x] Speaks in complete sentences  [] Appears tachypneic   [] Wheezing           [] Rhonchi   [] Decreased    [] CV RRR  [x] No Murmur  [] Murmur  [] Irregular  [x] Tachycardic    [] OTHER:  1}      TESTS ORDERED:    [] POCT FLU  [] POCT STREP  [x] COVID-19 Test sent  [] Appointment made at testing clinic for patient to get a COVID test.       TEST RESULTS:    POCT FLU test:  [] Positive  [x] Negative  POCT STREP test:  [] Positive  [] Negative    ASSESSMENT:  [] Allergic Rhinitis  [] Asthma Exacerbation  [] Bronchitis  [] COPD Exacerbation  [] Gastroenteritis  [] Influenza  [] Sinusitis  [] Strep Throat [] Sore Throat  [x] Viral URI   [x] Possible COVID-19   [] Exposure to COVID -19  [] Positive for COVID  [] Screening for Viral Disease (COVID test no sx)        Elder Mata was seen today for cough.     Diagnoses and all orders for this visit:    Fever, unspecified fever cause  -     POCT Influenza A/B Antigen: Negative  -     COVID-19    Suspected COVID-19 virus infection  -     COVID-19    Other orders  -     COVID-19  -     COVID-19  -     COVID-19            [] Low risk for complications from COVID 19  [] Moderate risk for complications from COVID 19  [] High risk for complications from COVID 19    PLAN:    [] Discharge home with written instructions for:  [] Flu management  [] Strep throat management  [x] Viral respiratory illness management  [] Sinusitis management  [] Bronchitis Management  [x] Possible COVID-19 infection with self-quarantine and management of symptoms  [] Follow-up with primary care physician or emergency department if worsens  [] Note given for work    [] Referred to emergency department for evaluation    Scribe attestation: Ana Massey LPN, am scribing for and in the presence of Matthew Sales MD. Electronically signed by Halle Osborne LPN on 87/27/25 at 7:05 PM EST

## 2022-12-16 LAB — SARS-COV-2: NOT DETECTED
